# Patient Record
Sex: MALE | Race: WHITE | Employment: PART TIME | URBAN - METROPOLITAN AREA
[De-identification: names, ages, dates, MRNs, and addresses within clinical notes are randomized per-mention and may not be internally consistent; named-entity substitution may affect disease eponyms.]

---
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
## 2025-01-16 ENCOUNTER — APPOINTMENT (OUTPATIENT)
Dept: PHYSICAL THERAPY | Facility: CLINIC | Age: 20
End: 2025-01-16
Payer: OTHER GOVERNMENT

## 2025-01-23 ENCOUNTER — TREATMENT (OUTPATIENT)
Dept: PHYSICAL THERAPY | Facility: CLINIC | Age: 20
End: 2025-01-23
Payer: OTHER GOVERNMENT

## 2025-01-23 DIAGNOSIS — M79.7 FIBROMYALGIA: Primary | ICD-10-CM

## 2025-01-23 PROCEDURE — 97113 AQUATIC THERAPY/EXERCISES: CPT | Mod: GP,CQ

## 2025-01-23 ASSESSMENT — PAIN SCALES - GENERAL: PAINLEVEL_OUTOF10: 4

## 2025-01-23 ASSESSMENT — PAIN - FUNCTIONAL ASSESSMENT: PAIN_FUNCTIONAL_ASSESSMENT: 0-10

## 2025-01-23 NOTE — PROGRESS NOTES
Physical Therapy  Physical Therapy Treatment    Patient Name: Benjamin Koeppen  MRN: [id number]  Today's Date: 1/23/2025  Time Calculation  Start Time: 1307  Stop Time: 1345  Time Calculation (min): 38 min    Insurance:  Visit number: 5 of [place]  Authorization info: No Auth  Insurance Type:  Select           General  Reason for Referral: Fibromyalgia  Referred By: Becky Ferris MD    Current Problem  1. Fibromyalgia          Precautions  STEADI Fall Risk Score (The score of 4 or more indicates an increased risk of falling): 6    Pain Assessment: 0-10  0-10 (Numeric) Pain Score: 4    Subjective:   Patient reports that they are doing well but they feel that their hamstrings have tighten up since last session.   HEP Performed:  Yes    Objective:  Pt displayed excessive trunk flexion compensation during hamstring stretch.     Treatments:   Pool therapy:  3ft depth   Squats 6'     4ft depth:   Walking fwd/bwd 6'   Lateral walking 6'   Marching 5'   Hip abduction 5'   Flex/ext 6'   HS stretch 2'     Charges: aquatics x3    Assessment:   Pt reports having a great deal of pull while performing hip flexion straight leg in hamstrings. Pt reported some relief after HS stretch in 4ft depth.     Plan:  Continue onto recheck with evaluating PT on land.   Anmol Humphrey PTA

## 2025-01-29 ENCOUNTER — TREATMENT (OUTPATIENT)
Dept: PHYSICAL THERAPY | Facility: CLINIC | Age: 20
End: 2025-01-29
Payer: OTHER GOVERNMENT

## 2025-01-29 DIAGNOSIS — M79.7 FIBROMYALGIA: Primary | ICD-10-CM

## 2025-01-29 PROCEDURE — 97110 THERAPEUTIC EXERCISES: CPT | Mod: GP

## 2025-01-29 NOTE — PROGRESS NOTES
Physical Therapy  Physical Therapy Orthopedic Progress and Discharge Note    Patient Name: Benjamin Koeppen  MRN: [id number]  Today's Date: 1/29/2025  Time Calculation  Start Time: 1332  Stop Time: 1357  Time Calculation (min): 25 min    Insurance:  Visit number: 6 of [place]  Authorization info: No Auth  Insurance Type:  Select           General  Reason for Referral: Fibromyalgia  Referred By: Becky Ferris MD    Current Problem  1. Fibromyalgia            Precautions: STEADI Fall Risk Score (The score of 4 or more indicates an increased risk of falling): 6       Subjective:     Patient reports that the pool therapy and the home exercise program have been helpful.  Pain has been more manageable and feels like his posture has improved.  Able to stand for longer periods of time. No longer on Pre-gabalin.      Reports that he does have access to a pool, but it is not warm.      Current Condition:   better     Pain  Current: 1/10  At worst: 4-5/10    Location: Diffuse body pain    Functional Limitations: Lifting heavy things    Self Reported Function (0-100%) = 80-90%  - 100% being back to PLOF    Performing HEP?: Yes    Late arrival due to mixing up appointment time.     Objective:   Posture: PPT, Flattened lordosis, exaggerated kyphosis, FH     Gait: Stable without assistive device      ROM     Lumbar AROM (%)     Flexion: 75%, reduced pelvic forward inclination  Extension: 75%, stiffness in lower back  (R) Side Bend: 100%, LBP  (L) Side Bend: 100%, LBP  (R) Rotation: 100%, LBP  (L) Rotation: 100%, LBP        Hip PROM (Degrees)                                         (R)                    (L)  Flexion:                        125                   125         Extension:                    15                     15                                   ER:                                65                     65                                   IR:                                 30                     25          "                                                                   Strength Testing     Hip                                      (R)                    (L)  Flexion:                        4+                    4+                                  Extension:                    4                      4                                       Knee                                      (R)                    (L)  Flexion:                        5                      5                                    Extension:                    5                      5                   Core Control:  Pelvic Bridge: Good lift  Prone hip extension: Maintains neutral lumbar spine                                         Flexibility                          (R)                    (L)  Hamstrings:                 -44                   -35  Hip Flexors:                  Mild castañeda  Mild castañeda  Quadriceps:                 0\"                     0\"  Gastroc:                       Mod castañeda           Mod castañeda     BEIGHTON SCORING SYSTEM FOR HYPERMOBILITY:  1) Fifth finger hyperextension R 1, L 1  2) Thumb to forearm R 1, L 1  3) Elbow hyperextension R 0, L 0  4) Knee hyperextension R 0, L 0  5) Spinal flexion (palms flat to floor) 0      Total Score: 4/9                                      Functional Movement  Sit to stand: WNL  Squat: Able to do deep squat with feet turned out  Bed Mobility: WNL                                         Balance     Single Leg Balance:      (R)>20\" steady             (L)>20\" steady          Outcome Measures: Updated 1/29/2025  Other Measures  Lower Extremity Funtional Score (LEFS): 74/80      Goals: Updated 1/29/2025  Active       PT Problem       Patient will report ability to stand/walk 1 hour with minimal difficulty. (Met)       Start:  10/02/24    Expected End:  12/25/24    Resolved:  01/29/25         Patient will demonstrate an improvement of at least 15 degrees in HS flexibility measures to allow for increased " ease with bending.  (Met)       Start:  10/02/24    Expected End:  12/25/24    Resolved:  01/29/25         Patient will demonstrate independence in home program for support of progression (Met)       Start:  10/02/24    Expected End:  10/23/24    Resolved:  01/29/25      Goal Note       Patient will demonstrate independence in home program for support of progression              Patient will report pain of 3/10 demonstrating a reduction of overall pain and improved activity tolerance. (Progressing)       Start:  10/02/24    Expected End:  12/25/24            Patient will be able to lift 15# waist to overhead with good control and without pain  > 3/10 (Met)       Start:  10/02/24    Expected End:  12/25/24    Resolved:  01/29/25         Patient will show a significant change in LEFS patient reported outcome tool to demonstrate subjective improvement (Met)       Start:  10/02/24    Expected End:  12/25/24    Resolved:  01/29/25                 Treatment Performed:      Therapeutic Exercise:    21 min  Performed recheck  Instructed in new exercises for HEP:  Prone prop for lumbar extension  Standing postural correction at wall with cervical retraction  Standing bilateral shoulder flexion AROM with back to wall    Other:     4 min  Postural education--seated and standing      Personalized Home Exercise Program:  Access Code: [email or web address]  URL: [email or web address]  Date: 01/29/2025  Prepared by: Mary Lew    Exercises  - Supine Bridge  - 1 x daily - 7 x weekly - 3 sets - 10 reps - 5 seconds hold  - Prone Hip Extension with Pillow Under Abdomen  - 1 x daily - 7 x weekly - 3 sets - 10 reps - 3 seconds hold  - Prone Scapular Retraction  - 1 x daily - 7 x weekly - 3 sets - 10 reps - 5 seconds hold  - Prone Press Up On Elbows  - 2 x daily - 7 x weekly - 5 reps - 30 seconds hold  - Supine Hamstring Stretch with Strap  - 1-2 x daily - 7 x weekly - 3 reps - 30 second hold  - Cervical Retraction at  Wall  - 2 x daily - 7 x weekly - 10 reps - 5 seconds hold  - Shoulder Flexion at Wall  - 2 x daily - 7 x weekly - 20 reps - 3-5 seconds hold    EDUCATION: home exercise program, plan of care, activity modifications, pain management, and injury pathology       Charges: TE x 2    Assessment:  Pain levels are much more manageable at this point.  Still demonstrates very kyphotic posture with FH.  Able to correct considerably with verbal cueing.  New exercises added for addressing stiffness with lumbar extension and with correction of forward head position.   Understanding of instruction, and ready for discharge to his [facility].          Plan of Care: Updated 1/29/2025     Discharge to HEP.     Mary Lew, PT